# Patient Record
Sex: FEMALE | Race: BLACK OR AFRICAN AMERICAN | NOT HISPANIC OR LATINO | URBAN - METROPOLITAN AREA
[De-identification: names, ages, dates, MRNs, and addresses within clinical notes are randomized per-mention and may not be internally consistent; named-entity substitution may affect disease eponyms.]

---

## 2024-10-08 ENCOUNTER — EMERGENCY (EMERGENCY)
Facility: HOSPITAL | Age: 79
LOS: 1 days | Discharge: HOME | End: 2024-10-08
Admitting: EMERGENCY MEDICINE
Payer: MEDICARE

## 2024-10-08 VITALS
TEMPERATURE: 97 F | WEIGHT: 160.06 LBS | SYSTOLIC BLOOD PRESSURE: 165 MMHG | HEART RATE: 68 BPM | RESPIRATION RATE: 18 BRPM | DIASTOLIC BLOOD PRESSURE: 91 MMHG | OXYGEN SATURATION: 97 %

## 2024-10-08 PROCEDURE — 73130 X-RAY EXAM OF HAND: CPT | Mod: 26,RT

## 2024-10-08 PROCEDURE — 99283 EMERGENCY DEPT VISIT LOW MDM: CPT | Mod: 25

## 2024-10-08 PROCEDURE — 99284 EMERGENCY DEPT VISIT MOD MDM: CPT

## 2024-10-08 PROCEDURE — 73130 X-RAY EXAM OF HAND: CPT

## 2024-10-08 RX ORDER — ACETAMINOPHEN 325 MG
650 TABLET ORAL ONCE
Refills: 0 | Status: COMPLETED | OUTPATIENT
Start: 2024-10-08 | End: 2024-10-08

## 2024-10-08 RX ADMIN — Medication 650 MILLIGRAM(S): at 20:27

## 2024-10-08 NOTE — ED PROVIDER NOTE - PHYSICAL EXAMINATION
CONSTITUTIONAL: Well-appearing;  in no apparent distress.   HEAD: Normocephalic; atraumatic.   EYES: PERRL; EOM intact; conjunctiva and sclera clear  ENT: normal nose; no rhinorrhea; normal pharynx with no erythema or lesions.   NECK: Supple; non-tender;   CARDIOVASCULAR: rrr,  RESPIRATORY: Breathing easily;   MSK: R hand- +tenderness and abrasion between thenar and hypothenar eminence

## 2024-10-08 NOTE — ED PROVIDER NOTE - NSFOLLOWUPINSTRUCTIONS_ED_ALL_ED_FT
Contusion    A contusion is a deep bruise. Contusions are the result of a blunt injury to tissues and muscle fibers under the skin. The skin overlying the contusion may turn blue, purple, or yellow. Symptoms also include pain and swelling in the injured area.    SEEK IMMEDIATE MEDICAL CARE IF YOU HAVE ANY OF THE FOLLOWING SYMPTOMS: severe pain, numbness, tingling, pain, weakness, or skin color/temperature change in any part of your body distal to the injury. Your preliminary results of your XR are normal. These will officially be read by the attending radiologist later today or tomorrow. If there are any abnormalities you will be called.    Contusion    A contusion is a deep bruise. Contusions are the result of a blunt injury to tissues and muscle fibers under the skin. The skin overlying the contusion may turn blue, purple, or yellow. Symptoms also include pain and swelling in the injured area.    SEEK IMMEDIATE MEDICAL CARE IF YOU HAVE ANY OF THE FOLLOWING SYMPTOMS: severe pain, numbness, tingling, pain, weakness, or skin color/temperature change in any part of your body distal to the injury.

## 2024-10-08 NOTE — ED PROVIDER NOTE - CLINICAL SUMMARY MEDICAL DECISION MAKING FREE TEXT BOX
79-year-old female complaining of pain to her right hand after she tripped and fell in the street.  Patient landed on her palm.  No head strike.  Patient was able to get up.  Denies any other trauma.  Denies headache, dizziness, neck pain, back pain, numbness, tingling. 79-year-old female complaining of pain to her right hand after she tripped and fell in the street.  Patient landed on her palm.  No head strike.  Patient was able to get up.  Denies any other trauma.  Denies headache, dizziness, neck pain, back pain, numbness, tingling. XR neg

## 2024-10-08 NOTE — ED ADULT TRIAGE NOTE - CHIEF COMPLAINT QUOTE
+ mechanical trip and fall over shoelaces [place], denies head strike/loc/weakness/dizziness, imapct to B hands with now mild soreness to B palms   Brother in hospital- visiting.

## 2024-10-08 NOTE — ED PROVIDER NOTE - OBJECTIVE STATEMENT
79-year-old female complaining of pain to her right hand after she tripped and fell in the street.  Patient landed on her palm.  No head strike.  Patient was able to get up.  Denies any other trauma.  Denies headache, dizziness, neck pain, back pain, numbness, tingling.

## 2024-10-08 NOTE — ED ADULT NURSE NOTE - OBJECTIVE STATEMENT
79y female presents to ED c/o bilateral hand pain after bilateral trip and fall. Pt ambulatory. A&Ox4.

## 2024-10-08 NOTE — ED PROVIDER NOTE - PATIENT PORTAL LINK FT
You can access the FollowMyHealth Patient Portal offered by [facility] by registering at the following website: [email or web address]. By joining [employer]’s FollowMyHealth portal, you will also be able to view your health information using other applications (apps) compatible with our system.

## 2024-10-08 NOTE — ED ADULT NURSE NOTE - NSFALLRISKINTERV_ED_ALL_ED

## 2024-10-10 DIAGNOSIS — S60.221A CONTUSION OF RIGHT HAND, INITIAL ENCOUNTER: ICD-10-CM

## 2024-10-10 DIAGNOSIS — M79.641 PAIN IN RIGHT HAND: ICD-10-CM
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
